# Patient Record
(demographics unavailable — no encounter records)

---

## 2024-11-22 NOTE — HISTORY OF PRESENT ILLNESS
[FreeTextEntry1] : Who is a returns with his daughter again.  History of severe pelvic floor spasm and microscopic hematuria.  Patient still is also suffers from tailbone pain.  Workup for microscopic hematuria showed no significant pathology.  Patient currently has no irritative or obstructive voiding symptoms however he does recently complain of some increased bladder discomfort pain with filling and urinalysis/culture will be sent off today.  Patient will also be treated with intermittent dosing with phenazopyridine to see if this might have some effect on his level of discomfort.  The patient received bilateral pudendal/perineal nerve blocks and trigger point injections to the pelvic floor musculature April 2024 and I so happy to hear that he had a very significant sustained response to the injections that has only become less effective within the past few weeks.  The patient is once again having difficulty sitting.  He has undergone physical therapy in the past which seems to have exacerbated pain but would be willing to see the physical therapists once again in conjunction with further injections to the pelvic floor.  The patient continues on a sole course of Plavix and understands the heightened risk of bleeding of any injection therapy and will be discussing whether alternatives of Plavix or a short discontinuance would be allowable.  In any event, the patient and his daughter are quite well aware of the associated risks and benefits and would like to proceed but may wish to have a physical therapy appointment before that date.  The patient also is well versed in the basics of pelvic floor relaxation and will be certain to be liberal with heat application and avoidance of constipation.  Given the aforementioned circumstances, I would feel comfortable moving forward.  We also discussed possible role for Botox injection to the pelvic floor musculature if further injections do not have a long lasting effect.  The pros and cons of Botox along with its non-FDA approved status in the situation were also discussed at today's visit.